# Patient Record
Sex: FEMALE | Race: WHITE | NOT HISPANIC OR LATINO | Employment: FULL TIME | URBAN - NONMETROPOLITAN AREA
[De-identification: names, ages, dates, MRNs, and addresses within clinical notes are randomized per-mention and may not be internally consistent; named-entity substitution may affect disease eponyms.]

---

## 2018-06-16 ENCOUNTER — HOSPITAL ENCOUNTER (EMERGENCY)
Facility: HOSPITAL | Age: 43
Discharge: HOME | End: 2018-06-17
Attending: EMERGENCY MEDICINE | Admitting: EMERGENCY MEDICINE

## 2018-06-16 ENCOUNTER — APPOINTMENT (OUTPATIENT)
Dept: GENERAL RADIOLOGY | Facility: HOSPITAL | Age: 43
End: 2018-06-16

## 2018-06-16 DIAGNOSIS — S90.32XA CONTUSION OF LEFT FOOT, INITIAL ENCOUNTER: Primary | ICD-10-CM

## 2018-06-16 PROCEDURE — 73630 X-RAY EXAM OF FOOT: CPT | Performed by: RADIOLOGY

## 2018-06-16 PROCEDURE — 99283 EMERGENCY DEPT VISIT LOW MDM: CPT

## 2018-06-16 PROCEDURE — 73630 X-RAY EXAM OF FOOT: CPT

## 2018-06-16 RX ORDER — TRAMADOL HYDROCHLORIDE 50 MG/1
50 TABLET ORAL EVERY 4 HOURS PRN
Qty: 12 TABLET | Refills: 0 | Status: SHIPPED | OUTPATIENT
Start: 2018-06-16 | End: 2019-01-18

## 2018-06-16 RX ORDER — HYDROCODONE BITARTRATE AND ACETAMINOPHEN 5; 325 MG/1; MG/1
1 TABLET ORAL EVERY 6 HOURS PRN
Status: DISCONTINUED | OUTPATIENT
Start: 2018-06-16 | End: 2018-06-17 | Stop reason: HOSPADM

## 2018-06-16 RX ADMIN — HYDROCODONE BITARTRATE AND ACETAMINOPHEN 1 TABLET: 5; 325 TABLET ORAL at 23:59

## 2018-06-17 VITALS
OXYGEN SATURATION: 99 % | TEMPERATURE: 98.2 F | SYSTOLIC BLOOD PRESSURE: 125 MMHG | HEART RATE: 80 BPM | WEIGHT: 137 LBS | BODY MASS INDEX: 26.9 KG/M2 | RESPIRATION RATE: 18 BRPM | HEIGHT: 60 IN | DIASTOLIC BLOOD PRESSURE: 74 MMHG

## 2018-06-17 NOTE — ED PROVIDER NOTES
Subjective     History provided by:  Patient  Lower Extremity Issue   Location:  Foot  Injury: yes    Mechanism of injury: crush    Crush:     Mechanism:  Falling object  Foot location:  L foot  Pain details:     Quality:  Aching and throbbing    Radiates to:  Does not radiate    Severity:  Moderate    Onset quality:  Gradual    Timing:  Constant  Dislocation: no    Foreign body present:  No foreign bodies  Tetanus status:  Up to date  Prior injury to area:  No  Relieved by:  None tried  Worsened by:  Nothing  Ineffective treatments:  None tried  Associated symptoms: swelling    Associated symptoms: no fever        Review of Systems   Constitutional: Negative.  Negative for fever.   HENT: Negative.    Respiratory: Negative.    Cardiovascular: Negative.  Negative for chest pain.   Gastrointestinal: Negative.  Negative for abdominal pain.   Endocrine: Negative.    Genitourinary: Negative.  Negative for dysuria.   Skin: Negative.    Neurological: Negative.    Psychiatric/Behavioral: Negative.    All other systems reviewed and are negative.      No past medical history on file.    Allergies   Allergen Reactions   • Penicillins Hives       No past surgical history on file.    No family history on file.    Social History     Social History   • Marital status: Single     Social History Main Topics   • Drug use: Unknown     Other Topics Concern   • Not on file           Objective   Physical Exam   Constitutional: She is oriented to person, place, and time. She appears well-developed and well-nourished. No distress.   HENT:   Head: Normocephalic and atraumatic.   Right Ear: External ear normal.   Left Ear: External ear normal.   Nose: Nose normal.   Eyes: Conjunctivae and EOM are normal. Pupils are equal, round, and reactive to light.   Neck: Normal range of motion. Neck supple. No JVD present. No tracheal deviation present.   Cardiovascular: Normal rate, regular rhythm and normal heart sounds.    No murmur  heard.  Pulmonary/Chest: Effort normal and breath sounds normal. No respiratory distress. She has no wheezes.   Abdominal: Soft. Bowel sounds are normal. There is no tenderness.   Musculoskeletal: Normal range of motion. She exhibits no edema or deformity.        Left foot: There is tenderness and swelling.   Neurological: She is alert and oriented to person, place, and time. No cranial nerve deficit.   Skin: Skin is warm and dry. No rash noted. She is not diaphoretic. No erythema. No pallor.   Psychiatric: She has a normal mood and affect. Her behavior is normal. Thought content normal.   Nursing note and vitals reviewed.      Procedures           ED Course                  MDM  Number of Diagnoses or Management Options  Contusion of left foot, initial encounter: new and does not require workup     Amount and/or Complexity of Data Reviewed  Tests in the radiology section of CPT®: reviewed    Risk of Complications, Morbidity, and/or Mortality  Presenting problems: low  Diagnostic procedures: low  Management options: low    Patient Progress  Patient progress: stable        Final diagnoses:   Contusion of left foot, initial encounter            Karen Mace, TRINA  06/17/18 8167

## 2018-06-18 ENCOUNTER — TELEPHONE (OUTPATIENT)
Dept: ORTHOPEDIC SURGERY | Facility: CLINIC | Age: 43
End: 2018-06-18

## 2018-06-19 ENCOUNTER — OFFICE VISIT (OUTPATIENT)
Dept: ORTHOPEDIC SURGERY | Facility: CLINIC | Age: 43
End: 2018-06-19

## 2018-06-19 VITALS — HEIGHT: 61 IN | TEMPERATURE: 98.1 F | BODY MASS INDEX: 26.51 KG/M2 | WEIGHT: 140.4 LBS

## 2018-06-19 DIAGNOSIS — S90.32XA CONTUSION OF LEFT FOOT, INITIAL ENCOUNTER: Primary | ICD-10-CM

## 2018-06-19 DIAGNOSIS — S90.812A ABRASION, FOOT, LEFT, INITIAL ENCOUNTER: ICD-10-CM

## 2018-06-19 PROCEDURE — 99202 OFFICE O/P NEW SF 15 MIN: CPT | Performed by: ORTHOPAEDIC SURGERY

## 2018-06-19 NOTE — PROGRESS NOTES
"Patient:  Ellie Muro is a 42 y.o. female    Chief Complaint/ Reason for Visit:    Chief Complaint   Patient presents with   • Left Foot - Establish Care, Pain       HPI:  This pleasant young lady tells me that this past Saturday she was taking the T topics off of her jeep, when one of them slipped out of her hands and fell, forcefully striking the top of her left foot.  She says the top section probably wait about 30 pounds.  She had the immediate onset of severe sharp pain and says \"it blew up immediately\", referring to immediate abrupt and significant swelling.  She went to the urgent care center where x-rays were negative for fracture, and was placed in a boot and issued crutches.  She called and requested to be seen here in the office and I have worked her in today.  Her pain is mild to moderate exacerbated by direct contact located on the dorsum of her left foot and alleviated by rest and elevation.  She has no calf pain, no chest pain, and no shortness of breath.  She has no history of a serious injury to the left foot before.      PMH:    Past Medical History:   Diagnosis Date   • Depression with anxiety        PSH:  History reviewed. No pertinent surgical history.    Social Hx:    Social History     Social History   • Marital status: Single     Spouse name: N/A   • Number of children: N/A   • Years of education: N/A     Occupational History   • Not on file.     Social History Main Topics   • Smoking status: Never Smoker   • Smokeless tobacco: Never Used   • Alcohol use Yes      Comment: 2 per week   • Drug use: No   • Sexual activity: Defer     Other Topics Concern   • Not on file     Social History Narrative   • No narrative on file       Family Hx:    Family History   Problem Relation Age of Onset   • Hypertension Mother    • Diabetes Father         type 2   • Cancer Maternal Uncle         Lung   • Heart disease Paternal Grandmother        Meds:    Current Outpatient Prescriptions:   •  traMADol " "(ULTRAM) 50 MG tablet, Take 1 tablet by mouth Every 4 (Four) Hours As Needed for Moderate Pain ., Disp: 12 tablet, Rfl: 0    Allergies:    Allergies   Allergen Reactions   • Penicillins Hives       ROS:  Review of Systems   All other systems reviewed and are negative.      Vitals:    06/19/18 0946   Temp: 98.1 °F (36.7 °C)   TempSrc: Temporal Artery    Weight: 63.7 kg (140 lb 6.4 oz)   Height: 153.7 cm (60.5\")     Body mass index is 26.97 kg/m².    Physical Exam    The patient is awake, alert, and oriented ×3.  The patient is in no acute distress.  Breathing is regular and unlabored with a respiratory rate of 12/m.  Extraocular movements and pupillary responses are symmetrically intact. Sclerae are anicteric.   Hearing is within normal limits.  Speech is within normal limits.  There is no jugular venous distention.    Left foot: The patient has a limp antalgic from left.  She has moderate swelling across the dorsal left forefoot.  There is a small area about 3 x 4 mm that is dark and on the dorsal foot.  I suspect this was the area of direct contact.  There is no surrounding erythema.  There is no cellulitis or lymphangitis.  The patient has a palpable regular dorsalis pedis pulse with satisfactory amplitude and a current heart rate of about 72 beats are minute.  Her left calf is soft and nontender.  Alignment, rotation, and cascade of the toes left foot appear within normal limits and appear symmetrical when compared to the right foot.  However, the patient does have ecchymosis going down into her toes and the plantar arch of her left foot.    Radiology: X-rays: 3 views the patient's left foot were reviewed on the [facility] radiology system from this past weekend.  Comparison images were not available.  These images show considerable soft tissue swelling, especially dorsally, however I see no fracture.  There is no dislocation or other osseous or articular abnormality acutely on these images.  I reviewed the " radiologist's report, and I agree with their findings.        Assessment:     Diagnosis Plan   1. Contusion of left foot, initial encounter     2. Abrasion, foot, left, initial encounter             Plan:  The patient has a boot orthosis that she received in the emergency facility.  I encouraged her to continue to use it as she was able, but advised her that after a week or so she could probably transition out of it to comfortable, supportive well-made athletic-type shoes.  Activity recommendations and precautions were discussed and the patient voiced understanding.  I'll see her back in a couple weeks for recheck.

## 2018-06-25 ENCOUNTER — TRANSCRIBE ORDERS (OUTPATIENT)
Dept: ADMINISTRATIVE | Facility: HOSPITAL | Age: 43
End: 2018-06-25

## 2018-06-25 ENCOUNTER — TELEPHONE (OUTPATIENT)
Dept: ORTHOPEDIC SURGERY | Facility: CLINIC | Age: 43
End: 2018-06-25

## 2018-06-25 DIAGNOSIS — S90.32XA CONTUSION OF LEFT FOOT, INITIAL ENCOUNTER: Primary | ICD-10-CM

## 2018-06-25 NOTE — TELEPHONE ENCOUNTER
Pat was last seen on 6/19. Patient is in extreme pain. Stated it is getting worse. This morning she can not bear weight on her lt foot. She stated that she walks a lot at work and cannot wear the boot with the sore on the top of the foot. 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 She is asking if an MRI would show anything and if she can get one. Patient's next appt is on 7/3. Please advise. Thank you!

## 2018-06-25 NOTE — TELEPHONE ENCOUNTER
As per LAMAR, MRI is ordered.   Additionally, a work note will be sent to her stating that she is to be off her foot. This means that either she will have to sit during work or be off work completely. I relayed this information to the patient.    LAMAR then added that she is to be off foot altogether.  After she schedules MRI, she is to call office back and set up appointment.  I LMOM for patient to return call to office to get this last bit of information.

## 2018-06-28 ENCOUNTER — TELEPHONE (OUTPATIENT)
Dept: ORTHOPEDIC SURGERY | Facility: CLINIC | Age: 43
End: 2018-06-28

## 2018-06-28 ENCOUNTER — HOSPITAL ENCOUNTER (OUTPATIENT)
Dept: MRI IMAGING | Facility: HOSPITAL | Age: 43
Discharge: HOME | End: 2018-06-28
Attending: ORTHOPAEDIC SURGERY | Admitting: ORTHOPAEDIC SURGERY

## 2018-06-28 DIAGNOSIS — S90.32XA CONTUSION OF LEFT FOOT, INITIAL ENCOUNTER: ICD-10-CM

## 2018-06-28 PROCEDURE — 73718 MRI LOWER EXTREMITY W/O DYE: CPT

## 2018-06-28 NOTE — TELEPHONE ENCOUNTER
Dr. Young, I will call the patient, however I used this phone #  [phone] to call patient today to ask her about what Rx the urgent care put her on. It worked fine then. Thanks, Qagan Tayagungin

## 2018-06-28 NOTE — TELEPHONE ENCOUNTER
I called patient and informed of reply from LAMAR and MRI results. She will continue to stay off of her foot and f/u on 7/3. She was advised to call if she has any worsening problems or concerns./[place]

## 2018-06-28 NOTE — TELEPHONE ENCOUNTER
That's fine, maybe my phone is restricted from calling long-distance numbers or something, as I try to 3 times and it didn't work.  Thank you for calling her.

## 2018-06-28 NOTE — TELEPHONE ENCOUNTER
"Patient called for results of MRI done today. Results are already in the chart. She is concerned. She went to a [facility] on Monday, 6/25 and says they treated her for infection of the foot. She has \"pus pockets\". Was put on Bactrim DS bid and Voltaren 75mg bid. Please advise.  "

## 2018-06-28 NOTE — TELEPHONE ENCOUNTER
The one and only phone number that she gave us does not work.  Something is wrong.    Please either call her or have the MAs call her and have her keep her scheduled follow-up with me next week.  She does not have any fractures nor is there any MRI evidence of infection.  She probably should not have insisted on going back to work.

## 2018-07-03 ENCOUNTER — TELEPHONE (OUTPATIENT)
Dept: ORTHOPEDIC SURGERY | Facility: CLINIC | Age: 43
End: 2018-07-03

## 2018-07-03 ENCOUNTER — OFFICE VISIT (OUTPATIENT)
Dept: ORTHOPEDIC SURGERY | Facility: CLINIC | Age: 43
End: 2018-07-03

## 2018-07-03 VITALS — TEMPERATURE: 97.6 F | WEIGHT: 139.8 LBS | BODY MASS INDEX: 26.39 KG/M2 | HEIGHT: 61 IN

## 2018-07-03 DIAGNOSIS — S90.32XD CONTUSION OF LEFT FOOT, SUBSEQUENT ENCOUNTER: ICD-10-CM

## 2018-07-03 DIAGNOSIS — S90.32XD TRAUMATIC HEMATOMA OF LEFT FOOT, SUBSEQUENT ENCOUNTER: Primary | ICD-10-CM

## 2018-07-03 PROBLEM — S90.32XA TRAUMATIC HEMATOMA OF LEFT FOOT: Status: ACTIVE | Noted: 2018-07-03

## 2018-07-03 PROCEDURE — 99213 OFFICE O/P EST LOW 20 MIN: CPT | Performed by: ORTHOPAEDIC SURGERY

## 2018-07-03 RX ORDER — SULFAMETHOXAZOLE AND TRIMETHOPRIM 800; 160 MG/1; MG/1
1 TABLET ORAL
COMMUNITY
Start: 2018-06-25 | End: 2018-07-05

## 2018-07-03 RX ORDER — DICLOFENAC SODIUM 75 MG/1
75 TABLET, DELAYED RELEASE ORAL
COMMUNITY
Start: 2018-06-25 | End: 2019-01-18

## 2018-07-03 NOTE — PROGRESS NOTES
Patient:  Ellie Muro is a 42 y.o. female    Chief Complaint/ Reason for Visit:    Chief Complaint   Patient presents with   • Left Foot - Follow-up, Pain       HPI:  The patient returns today, accompanied by her mother, for recheck on her left foot injury and reviewed the MRI that we ordered.  Her pain got worse.  We ordered an MRI to rule out occult fracture.  Also, a few days after her last visit, she developed redness on her foot and went to an immediate care center.  She said they told her she might possibly have an infection, and put her on an antibiotic.  She is on Bactrim, and says she has a couple of more days of treatment.  She has had no fever, chills, sweats, or shakes.  She feels like the foot has improved considerably with respect to swelling, pain, and discoloration.  Rest and elevation help her discomfort.  She is able to tolerate the boot a little more now than she was before.  She has had no calf pain, chest pain, or shortness of breath.  She has had no fever, chills, sweats, or shakes.      PMH:    Past Medical History:   Diagnosis Date   • Depression with anxiety        PSH:  History reviewed. No pertinent surgical history.    Social Hx:    Social History     Social History   • Marital status: Single     Spouse name: N/A   • Number of children: N/A   • Years of education: N/A     Occupational History   • Not on file.     Social History Main Topics   • Smoking status: Never Smoker   • Smokeless tobacco: Never Used   • Alcohol use Yes      Comment: 2 per week   • Drug use: No   • Sexual activity: Defer     Other Topics Concern   • Not on file     Social History Narrative   • No narrative on file       Family Hx:    Family History   Problem Relation Age of Onset   • Hypertension Mother    • Diabetes Father         type 2   • Cancer Maternal Uncle         Lung   • Heart disease Paternal Grandmother        Meds:    Current Outpatient Prescriptions:   •  diclofenac (VOLTAREN) 75 MG EC tablet, Take 75  "mg by mouth., Disp: , Rfl:   •  sulfamethoxazole-trimethoprim (BACTRIM DS,SEPTRA DS) 800-160 MG per tablet, Take 1 tablet by mouth., Disp: , Rfl:   •  traMADol (ULTRAM) 50 MG tablet, Take 1 tablet by mouth Every 4 (Four) Hours As Needed for Moderate Pain ., Disp: 12 tablet, Rfl: 0    Allergies:    Allergies   Allergen Reactions   • Penicillins Hives       ROS:  Review of Systems    Vitals:    07/03/18 0940   Temp: 97.6 °F (36.4 °C)   TempSrc: Temporal Artery    Weight: 63.4 kg (139 lb 12.8 oz)   Height: 153.7 cm (60.5\")     Body mass index is 26.85 kg/m².    Physical Exam    The patient is awake, alert, and oriented ×3.  The patient is in no acute distress.  Breathing is regular and unlabored with a respiratory rate of 12/m.  Extraocular movements and pupillary responses are symmetrically intact. Sclerae are anicteric.   Hearing is within normal limits.  Speech is within normal limits.  There is no jugular venous distention.    Left foot: The patient still has swelling and fullness in the dorsal forefoot consistent with subcutaneous hematoma.  There is a small punctate area perhaps 3 x 4 mm that has an eschar, but there is no surrounding erythema, no cellulitis, and no lymphangitis.  Her left calf is soft and nontender with no venous cord.  Sensory exams decreased in the dorsal forefoot and toes distal to the injury.  There is some deep tendon ecchymosis developing in her toes.  She has active dorsiflexion and plantarflexion of her toes actively.  Capillary filling is brisk in all toes.        Radiology: MRI left foot: I reviewed the images and the radiologist's report.  The patient appears to have a subcutaneous hematoma.  There is no fracture.  There is no obvious tendon injury.  I reviewed the radiologist's report, and my findings are essentially the same.  There is no comparison MRI available.        Assessment:     Diagnosis Plan   1. Traumatic hematoma of left foot, subsequent encounter     2. Contusion of " left foot, subsequent encounter             Plan:  I advised the patient that she should continue to elevate her foot.  I also fitted her with an Ace bandage for compression to help accelerate the resolution of her hematoma.  Infection precautions were reviewed and reinforced, and I advised her to finish all of her antibiotic course.  I think it would be best if she continue to work from home, unless she could return to work seated work only.    I spent 17 minutes in the evaluation and management of this patient today, 12 minutes of which were spent in face-to-face contact, discussion, education, counseling, and question and answer session.  Additional time was spent reviewing records from her visit to the immediate care center, etc.

## 2018-07-03 NOTE — TELEPHONE ENCOUNTER
Patient received a letter from [employer] today stating to work from home was not approved by her job. She stated that she needs another letter letting her go back to work with certain restrictions. Patient req a call to discuss what letter needs to state please advise. Thank you!

## 2018-07-03 NOTE — TELEPHONE ENCOUNTER
Patient called back stating she needs an answer today regarding work note. I informed her that LAMAR has already gone to surgery and the office is closed tomorrow for holiday. Cannot confirm that LAMAR will see this message today.  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 They will not let her work from home. She can't afford to take off-it would be unpaid leave.

## 2018-07-05 NOTE — TELEPHONE ENCOUNTER
Letter has been created and given to LAMAR for signature and the patient has been notified and at patients request it has been faxed to the number she provided.

## 2018-07-05 NOTE — TELEPHONE ENCOUNTER
Please give her a work note that says she may do see did/sedentary work only.  She may only be ambulatory as necessary to get to and from her place of work, and 2 go on breaks, to eat lunch, and to go to the bathroom.

## 2018-11-02 ENCOUNTER — APPOINTMENT (OUTPATIENT)
Dept: WOMENS IMAGING | Facility: HOSPITAL | Age: 43
End: 2018-11-02

## 2018-11-02 PROCEDURE — 77067 SCR MAMMO BI INCL CAD: CPT | Performed by: RADIOLOGY

## 2018-11-02 PROCEDURE — 77063 BREAST TOMOSYNTHESIS BI: CPT | Performed by: RADIOLOGY

## 2019-01-18 ENCOUNTER — OFFICE VISIT (OUTPATIENT)
Dept: FAMILY MEDICINE CLINIC | Facility: CLINIC | Age: 44
End: 2019-01-18

## 2019-01-18 VITALS
SYSTOLIC BLOOD PRESSURE: 128 MMHG | WEIGHT: 146 LBS | OXYGEN SATURATION: 98 % | HEIGHT: 61 IN | DIASTOLIC BLOOD PRESSURE: 88 MMHG | BODY MASS INDEX: 27.56 KG/M2 | RESPIRATION RATE: 14 BRPM | HEART RATE: 89 BPM

## 2019-01-18 DIAGNOSIS — Z13.220 SCREENING FOR HYPERLIPIDEMIA: ICD-10-CM

## 2019-01-18 DIAGNOSIS — F41.9 ANXIETY AND DEPRESSION: Primary | ICD-10-CM

## 2019-01-18 DIAGNOSIS — F32.A ANXIETY AND DEPRESSION: Primary | ICD-10-CM

## 2019-01-18 DIAGNOSIS — M25.50 ARTHRALGIA, UNSPECIFIED JOINT: ICD-10-CM

## 2019-01-18 DIAGNOSIS — M79.10 MYALGIA: ICD-10-CM

## 2019-01-18 DIAGNOSIS — R31.9 HEMATURIA, UNSPECIFIED TYPE: ICD-10-CM

## 2019-01-18 DIAGNOSIS — R35.0 URINARY FREQUENCY: ICD-10-CM

## 2019-01-18 DIAGNOSIS — R53.82 CHRONIC FATIGUE: ICD-10-CM

## 2019-01-18 PROBLEM — S90.32XA TRAUMATIC HEMATOMA OF LEFT FOOT: Status: RESOLVED | Noted: 2018-07-03 | Resolved: 2019-01-18

## 2019-01-18 PROBLEM — S90.32XA CONTUSION OF LEFT FOOT: Status: RESOLVED | Noted: 2018-06-19 | Resolved: 2019-01-18

## 2019-01-18 PROBLEM — S90.812A: Status: RESOLVED | Noted: 2018-06-19 | Resolved: 2019-01-18

## 2019-01-18 LAB
25(OH)D3+25(OH)D2 SERPL-MCNC: 24.6 NG/ML (ref 30–100)
ALBUMIN SERPL-MCNC: 4.5 G/DL (ref 3.5–5.2)
ALBUMIN/GLOB SERPL: 1.4 G/DL
ALP SERPL-CCNC: 37 U/L (ref 39–117)
ALT SERPL-CCNC: 21 U/L (ref 1–33)
APPEARANCE UR: CLEAR
AST SERPL-CCNC: 18 U/L (ref 1–32)
BACTERIA #/AREA URNS HPF: ABNORMAL /HPF
BILIRUB BLD-MCNC: NEGATIVE MG/DL
BILIRUB SERPL-MCNC: 0.3 MG/DL (ref 0.1–1.2)
BILIRUB UR QL STRIP: NEGATIVE
BUN SERPL-MCNC: 13 MG/DL (ref 6–20)
BUN/CREAT SERPL: 16.9 (ref 7–25)
CALCIUM SERPL-MCNC: 9.9 MG/DL (ref 8.6–10.5)
CASTS URNS MICRO: ABNORMAL
CHLORIDE SERPL-SCNC: 98 MMOL/L (ref 98–107)
CHOLEST SERPL-MCNC: 195 MG/DL (ref 0–200)
CLARITY, POC: ABNORMAL
CO2 SERPL-SCNC: 23.6 MMOL/L (ref 22–29)
COLOR UR: ABNORMAL
COLOR UR: YELLOW
CREAT SERPL-MCNC: 0.77 MG/DL (ref 0.57–1)
CRP SERPL-MCNC: 0.07 MG/DL (ref 0–0.5)
EPI CELLS #/AREA URNS HPF: ABNORMAL /HPF
ERYTHROCYTE [DISTWIDTH] IN BLOOD BY AUTOMATED COUNT: 13.3 % (ref 11.7–13)
GLOBULIN SER CALC-MCNC: 3.2 GM/DL
GLUCOSE SERPL-MCNC: 67 MG/DL (ref 65–99)
GLUCOSE UR QL: NEGATIVE
GLUCOSE UR STRIP-MCNC: NEGATIVE MG/DL
HCT VFR BLD AUTO: 42.3 % (ref 35.6–45.5)
HDLC SERPL-MCNC: 87 MG/DL (ref 40–60)
HGB BLD-MCNC: 13.6 G/DL (ref 11.9–15.5)
HGB UR QL STRIP: NEGATIVE
KETONES UR QL STRIP: (no result)
KETONES UR QL: ABNORMAL
LDLC SERPL CALC-MCNC: 96 MG/DL (ref 0–100)
LEUKOCYTE EST, POC: NEGATIVE
LEUKOCYTE ESTERASE UR QL STRIP: NEGATIVE
MCH RBC QN AUTO: 29.8 PG (ref 26.9–32)
MCHC RBC AUTO-ENTMCNC: 32.2 G/DL (ref 32.4–36.3)
MCV RBC AUTO: 92.6 FL (ref 80.5–98.2)
NITRITE UR QL STRIP: NEGATIVE
NITRITE UR-MCNC: NEGATIVE MG/ML
PH UR STRIP: 5.5 [PH] (ref 5–8)
PH UR: 5 [PH] (ref 5–8)
PLATELET # BLD AUTO: 382 10*3/MM3 (ref 140–500)
POTASSIUM SERPL-SCNC: 4.9 MMOL/L (ref 3.5–5.2)
PROT SERPL-MCNC: 7.7 G/DL (ref 6–8.5)
PROT UR QL STRIP: NEGATIVE
PROT UR STRIP-MCNC: NEGATIVE MG/DL
RBC # BLD AUTO: 4.57 10*6/MM3 (ref 3.9–5.2)
RBC # UR STRIP: ABNORMAL /UL
RBC #/AREA URNS HPF: ABNORMAL /HPF
SODIUM SERPL-SCNC: 141 MMOL/L (ref 136–145)
SP GR UR: 1.02 (ref 1–1.03)
SP GR UR: 1.03 (ref 1–1.03)
TRIGL SERPL-MCNC: 62 MG/DL (ref 0–150)
TSH SERPL DL<=0.005 MIU/L-ACNC: 2.8 MIU/ML (ref 0.27–4.2)
UROBILINOGEN UR QL: NORMAL
UROBILINOGEN UR STRIP-MCNC: (no result) MG/DL
VLDLC SERPL CALC-MCNC: 12.4 MG/DL (ref 5–40)
WBC # BLD AUTO: 6.39 10*3/MM3 (ref 4.5–10.7)
WBC #/AREA URNS HPF: ABNORMAL /HPF

## 2019-01-18 PROCEDURE — 99204 OFFICE O/P NEW MOD 45 MIN: CPT | Performed by: FAMILY MEDICINE

## 2019-01-18 PROCEDURE — 81002 URINALYSIS NONAUTO W/O SCOPE: CPT | Performed by: FAMILY MEDICINE

## 2019-01-18 RX ORDER — PROPRANOLOL HYDROCHLORIDE 20 MG/1
20 TABLET ORAL 3 TIMES DAILY
Qty: 90 TABLET | Refills: 1 | Status: SHIPPED | OUTPATIENT
Start: 2019-01-18 | End: 2019-02-18

## 2019-01-18 RX ORDER — BUPROPION HYDROCHLORIDE 300 MG/1
300 TABLET ORAL
COMMUNITY
Start: 2015-07-23 | End: 2019-01-18

## 2019-01-18 RX ORDER — BUPROPION HYDROCHLORIDE 150 MG/1
150 TABLET ORAL DAILY
Qty: 30 TABLET | Refills: 3 | Status: SHIPPED | OUTPATIENT
Start: 2019-01-18 | End: 2019-03-28 | Stop reason: SDUPTHER

## 2019-01-18 RX ORDER — SULFAMETHOXAZOLE AND TRIMETHOPRIM 800; 160 MG/1; MG/1
1 TABLET ORAL 2 TIMES DAILY
Qty: 14 TABLET | Refills: 0 | Status: SHIPPED | OUTPATIENT
Start: 2019-01-18 | End: 2019-02-18

## 2019-01-18 NOTE — PROGRESS NOTES
Subjective   Ellie Muro is a 43 y.o. female.     Chief Complaint   Patient presents with   • Anxiety   • Depression   • Insomnia       HPI     Moved to [place] from [place] about 2 yr ago and needs to establish care with a PCP after a 2-3 yr gap in insurance coverage     Anxiety and Depression:  -chronic condition  -mood generally worsens during the winter  -she has gained 20 lb in the last 2 years, which has affected her self esteem  -anhedonia and apathy interspersed with periods of intense worrying  -panic attacks 2-3 x per week  -co-morbid claustrophobia  -comorbid insomnia, for which OTC Unisom did help a little when she tried it a few months ago  -previously treated with wellbutrin  mg/day and ativan prn; this regimen worked well for her and she would like to resume it if possible  -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  Her chiropractor has suggested a lab work up for inflammatory conditions.           Review of Systems   Constitutional: Positive for unexpected weight change. Negative for fatigue and fever.   HENT: Negative for congestion and sore throat.    Respiratory: Negative for cough and shortness of breath.    Cardiovascular: Negative for chest pain and palpitations.   Gastrointestinal: Negative for abdominal pain and nausea.   Genitourinary: Positive for frequency and urgency. Negative for difficulty urinating, dysuria and hematuria.        Nocturia 3 x per night   Musculoskeletal: Positive for arthralgias, back pain and myalgias. Negative for gait problem.   Skin: Negative for rash and wound.   Neurological: Negative for dizziness and headaches.   Psychiatric/Behavioral: Positive for  decreased concentration, dysphoric mood and sleep disturbance. Negative for self-injury and suicidal ideas. The patient is nervous/anxious.        The following portions of the patient's history were reviewed and updated as appropriate: allergies, current medications, past family history, past medical history, past social history, past surgical history and problem list.    Past Medical History:   Diagnosis Date   • Allergic rhinitis    • Anxiety    • Depression    • Overactive bladder     due to a congenitally small bladder     Past Surgical History:   Procedure Laterality Date   • WISDOM TOOTH EXTRACTION       Family History   Problem Relation Age of Onset   • Hypertension Mother    • Thyroid disease Mother    • Depression Mother    • Alcohol abuse Mother    • Diabetes Father         type 2   • Cancer Maternal Uncle         Lung   • Heart disease Paternal Grandmother      Social History     Tobacco Use   • Smoking status: Never Smoker   • Smokeless tobacco: Never Used   Substance and Sexual Activity   • Alcohol use: Yes     Comment: about 2 drinks per night   • Drug use: No                Social History Narrative    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  Lives at home with her 2 dogs.          Allergies   Allergen Reactions   • Penicillins Angioedema     And hives        Medications:  none    Objective     Vitals:    01/18/19 1045   BP: 128/88   Pulse: 89   Resp: 14   SpO2: 98%       Physical Exam   Constitutional: She appears well-developed and well-nourished. No distress.   HENT:   Head: Normocephalic and atraumatic.   Mouth/Throat: Oropharynx is clear and moist.   Eyes: Conjunctivae are normal. Pupils are equal, round, and reactive to light.   Neck: No thyromegaly present.   Cardiovascular: Normal rate, regular rhythm and normal heart sounds. Exam reveals no gallop and no friction rub.   No murmur heard.  Pulses:       Radial pulses are 2+ on the right side, and 2+ on the left side.   Pulmonary/Chest: Effort  normal and breath sounds normal. No respiratory distress. She has no wheezes. She has no rhonchi. She has no rales.   Abdominal: Soft. Bowel sounds are normal. She exhibits no distension. There is no tenderness.   Lymphadenopathy:     She has no cervical adenopathy.   Neurological: She has normal strength. Gait normal.   Skin: Skin is warm and dry.   Psychiatric: Her mood appears anxious. Her speech is rapid and/or pressured.       ASSESSMENT/PLAN             Visit Diagnoses     Anxiety and depression    -  Primary    Relevant Medications    Resume buPROPion XL (WELLBUTRIN XL) 150 MG 24 hr tablet    propranolol (INDERAL) 20 MG tablet TID prn anxiety or panic attack  Pt cautioned about risk for lowered seizure threshold with Wellbutrin and risk for dizziness with propranolol    Other Relevant Orders    Ambulatory Referral to Psychiatry    TSH    Chronic fatigue        Relevant Orders    Vitamin D 25 hydroxy    Comprehensive metabolic panel    CBC No Differential    TSH    Myalgia        Relevant Orders    Sedimentation rate, automated    C-reactive protein    SEPIDEH    Rheumatoid Factor    Arthralgia, unspecified joint        Relevant Orders    Sedimentation rate, automated    C-reactive protein    SEPIDEH    Rheumatoid Factor    Urinary frequency        Relevant Orders    POC Urinalysis Dipstick (Completed) + for Hematuria, and ketones (pt on ketogenic diet)    Urine Culture - Urine, Urine, Clean Catch    Urinalysis With Microscopic - Urine, Clean Catch  Will treat for UTI with a 7 day course of bactrim BID      Screening for hyperlipidemia        Relevant Orders    Lipid panel         Patient Instructions   Try over the counter Unisom and Melatonin as needed for insomnia.      Try to avoid electronic screens for at least 1 hour prior to bed, limit fluids after dinner, and set a consistent bed time.        Return in about 1 month (around 2/18/2019) for Recheck mood.      Kenna Vaughn MD  01/18/19

## 2019-01-18 NOTE — PATIENT INSTRUCTIONS
Try over the counter Unisom and Melatonin as needed for insomnia.      Try to avoid electronic screens for at least 1 hour prior to bed, limit fluids after dinner, and set a consistent bed time.

## 2019-01-19 LAB
ANA SER QL: NEGATIVE
ERYTHROCYTE [SEDIMENTATION RATE] IN BLOOD BY WESTERGREN METHOD: 5 MM/HR (ref 0–20)
RHEUMATOID FACT SERPL-ACNC: <10 IU/ML (ref 0–13.9)

## 2019-01-20 ENCOUNTER — PATIENT MESSAGE (OUTPATIENT)
Dept: FAMILY MEDICINE CLINIC | Facility: CLINIC | Age: 44
End: 2019-01-20

## 2019-01-20 LAB
BACTERIA UR CULT: NO GROWTH
BACTERIA UR CULT: NORMAL

## 2019-02-14 NOTE — PROGRESS NOTES
F/u mood    Anxiety/depression: Resumed bupropion 150mg. Prn Propranolol for panic attacks    Anxiety and depression    -  Primary      Relevant Medications     Resume buPROPion XL (WELLBUTRIN XL) 150 MG 24 hr tablet     propranolol (INDERAL) 20 MG tablet TID prn anxiety or panic attack  Pt cautioned about risk for lowered seizure threshold with Wellbutrin and risk for dizziness with propranolol     Other Relevant Orders     Ambulatory Referral to Psychiatry     TSH     Chronic fatigue         Relevant Orders     Vitamin D 25 hydroxy     Comprehensive metabolic panel     CBC No Differential     TSH     Myalgia         Relevant Orders     Sedimentation rate, automated     C-reactive protein     SEPIDEH     Rheumatoid Factor     Arthralgia, unspecified joint         Relevant Orders     Sedimentation rate, automated     C-reactive protein     SEPIDEH     Rheumatoid Factor     Urinary frequency         Relevant Orders     POC Urinalysis Dipstick (Completed) + for Hematuria, and ketones (pt on ketogenic diet)     Urine Culture - Urine, Urine, Clean Catch     Urinalysis With Microscopic - Urine, Clean Catch  Will treat for UTI with a 7 day course of bactrim BID        Screening for hyperlipidemia         Relevant Orders     Lipid panel

## 2019-02-18 ENCOUNTER — OFFICE VISIT (OUTPATIENT)
Dept: FAMILY MEDICINE CLINIC | Facility: CLINIC | Age: 44
End: 2019-02-18

## 2019-02-18 VITALS
BODY MASS INDEX: 27.56 KG/M2 | OXYGEN SATURATION: 98 % | HEIGHT: 61 IN | WEIGHT: 146 LBS | SYSTOLIC BLOOD PRESSURE: 122 MMHG | DIASTOLIC BLOOD PRESSURE: 80 MMHG | HEART RATE: 91 BPM

## 2019-02-18 DIAGNOSIS — R31.29 MICROSCOPIC HEMATURIA: Primary | ICD-10-CM

## 2019-02-18 DIAGNOSIS — F32.A ANXIETY AND DEPRESSION: ICD-10-CM

## 2019-02-18 DIAGNOSIS — F41.9 ANXIETY AND DEPRESSION: ICD-10-CM

## 2019-02-18 DIAGNOSIS — Z79.899 HIGH RISK MEDICATION USE: ICD-10-CM

## 2019-02-18 DIAGNOSIS — Z87.448 HISTORY OF HEMATURIA: ICD-10-CM

## 2019-02-18 LAB
BILIRUB BLD-MCNC: NEGATIVE MG/DL
CLARITY, POC: ABNORMAL
COLOR UR: ABNORMAL
GLUCOSE UR STRIP-MCNC: NEGATIVE MG/DL
KETONES UR QL: ABNORMAL
LEUKOCYTE EST, POC: NEGATIVE
NITRITE UR-MCNC: NEGATIVE MG/ML
PH UR: 5 [PH] (ref 5–8)
POC AMPHETAMINES: NEGATIVE
POC BARBITURATES: NEGATIVE
POC BENZODIAZEPHINES: NEGATIVE
POC COCAINE: NEGATIVE
POC METHADONE: NEGATIVE
POC METHAMPHETAMINE SCREEN URINE: NEGATIVE
POC OPIATES: NEGATIVE
POC OXYCODONE: NEGATIVE
POC PHENCYCLIDINE: NEGATIVE
POC PROPOXYPHENE: NEGATIVE
POC THC: NEGATIVE
POC TRICYCLIC ANTIDEPRESSANTS: NEGATIVE
PROT UR STRIP-MCNC: NEGATIVE MG/DL
RBC # UR STRIP: ABNORMAL /UL
SP GR UR: 1.02 (ref 1–1.03)
UROBILINOGEN UR QL: NORMAL

## 2019-02-18 PROCEDURE — 99214 OFFICE O/P EST MOD 30 MIN: CPT | Performed by: FAMILY MEDICINE

## 2019-02-18 PROCEDURE — 81002 URINALYSIS NONAUTO W/O SCOPE: CPT | Performed by: FAMILY MEDICINE

## 2019-02-18 RX ORDER — LORAZEPAM 0.5 MG/1
0.5 TABLET ORAL EVERY 8 HOURS PRN
Qty: 45 TABLET | Refills: 0 | Status: SHIPPED | OUTPATIENT
Start: 2019-02-18 | End: 2019-05-02 | Stop reason: SDUPTHER

## 2019-02-18 NOTE — PROGRESS NOTES
Ellie Muro is a 43 y.o. female.   Chief Complaint   Patient presents with   • Anxiety   • bladder issues       Subjective       HPI       Anxiety and depression:  -pt resumed bupropion  mg daily about 1 month ago and is tolerating it well  -she thinks the bupropion is helping with depression but not anxiety  -she feels more motivated over-all, has started working out again, and her general energy level has improved  -last month she was also prescribed propranolol 20 mg TID prn panic attacks (she had previously taken ativan as a prn medication); she has not found the propranolol helpful   -panic attacks several times per week, usually triggered by work stress  -she would like to resume ativan as a prn medication if possible  -planning to schedule a psychiatry appt soon  -she has cut back on ETOH intake  -she is trying to lose weight by exercising regularly ([employer]) and following a low-carb diet; she feels that not being able to lose weight is having a negative affect on her self esteem  -she would like to start dating, but struggles with initiating the process due to anxiety and panic attacks    Bladder issues:  -chronic nocturia 1-3 x per night  -intermittent difficulty with initiating urination (few second delay)  -NO dysuria, incontinence or gross hematuria  -U/A at last office visit + for ketones and blood, but urine culture negative  -pt would like to get a follow up U/A today    Review of Systems   Constitutional: Negative for fatigue and fever.   HENT: Negative for congestion and sore throat.    Respiratory: Negative for cough and shortness of breath.    Cardiovascular: Negative for chest pain and palpitations.   Gastrointestinal: Negative for abdominal pain and nausea.   Genitourinary: Positive for difficulty urinating (occasionally) and frequency. Negative for dysuria, flank pain, hematuria, pelvic pain and urgency.   Musculoskeletal: Negative for arthralgias and myalgias.   Skin: Negative for rash.    Neurological: Negative for dizziness, seizures, syncope, weakness and headaches.   Psychiatric/Behavioral: Negative for dysphoric mood and suicidal ideas. The patient is nervous/anxious.        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  Lives at home with her 2 dogs.         Outpatient Medications Prior to Visit   Medication Sig Dispense Refill   • buPROPion XL (WELLBUTRIN XL) 150 MG 24 hr tablet Take 1 tablet by mouth Daily. 30 tablet 3   • propranolol (INDERAL) 20 MG tablet Take 1 tablet by mouth 3 (Three) Times a Day. 90 tablet 1     Objective     Vitals:    02/18/19 0940   BP: 122/80   Pulse: 91   SpO2: 98%     Physical Exam   Constitutional: She appears well-developed and well-nourished. No distress.   HENT:   Head: Normocephalic and atraumatic.   Mouth/Throat: Oropharynx is clear and moist.   Eyes: Conjunctivae are normal. Pupils are equal, round, and reactive to light.   Neck: No thyromegaly present.   Cardiovascular: Normal rate, regular rhythm and normal heart sounds. Exam reveals no gallop and no friction rub.   No murmur heard.  Pulses:       Radial pulses are 2+ on the right side, and 2+ on the left side.   Pulmonary/Chest: Effort normal and breath sounds normal. No respiratory distress. She has no wheezes. She has no rhonchi. She has no rales.   Abdominal: Soft. Bowel sounds are normal. She exhibits no distension. There is no tenderness.   Lymphadenopathy:     She has no cervical adenopathy.   Neurological: She has normal strength. Gait normal.   Skin: Skin is warm and dry.    Psychiatric: Her behavior is normal. Her mood appears anxious. Her speech is not rapid and/or pressured.       ASSESSMENT/PLAN           Visit Diagnoses     Microscopic hematuria    -  Primary    Relevant Orders    Ambulatory Referral to (Uro)Gynecology    History of hematuria        Relevant Orders    POC Urinalysis Dipstick (Completed) + for blood    Anxiety and depression      Continue burpropion  D/C propranolol    Relevant Medications    Resume LORazepam (ATIVAN) 0.5 MG tablet TID prn panic attacks  FRITZ reviewed and consistent with reported history.    Urine drug screen appropriate.    Controlled substance contract completed and signed.       High risk medication use        Relevant Orders    POC Urine Drug Screen, Triage (Completed) normal        Return in about 2 months (around 4/18/2019).      Kenna Vaughn MD  02/18/19

## 2019-03-28 DIAGNOSIS — F32.A ANXIETY AND DEPRESSION: ICD-10-CM

## 2019-03-28 DIAGNOSIS — F41.9 ANXIETY AND DEPRESSION: ICD-10-CM

## 2019-03-28 RX ORDER — BUPROPION HYDROCHLORIDE 150 MG/1
150 TABLET ORAL DAILY
Qty: 90 TABLET | Refills: 2 | Status: SHIPPED | OUTPATIENT
Start: 2019-03-28 | End: 2020-01-03 | Stop reason: SDUPTHER

## 2019-05-02 DIAGNOSIS — F32.A ANXIETY AND DEPRESSION: ICD-10-CM

## 2019-05-02 DIAGNOSIS — F41.9 ANXIETY AND DEPRESSION: ICD-10-CM

## 2019-05-02 RX ORDER — LORAZEPAM 0.5 MG/1
TABLET ORAL
Qty: 45 TABLET | Refills: 0 | Status: SHIPPED | OUTPATIENT
Start: 2019-05-02 | End: 2019-07-31 | Stop reason: SDUPTHER

## 2019-07-31 DIAGNOSIS — F32.A ANXIETY AND DEPRESSION: ICD-10-CM

## 2019-07-31 DIAGNOSIS — F41.9 ANXIETY AND DEPRESSION: ICD-10-CM

## 2019-07-31 RX ORDER — LORAZEPAM 0.5 MG/1
TABLET ORAL
Qty: 45 TABLET | Refills: 0 | Status: CANCELLED | OUTPATIENT
Start: 2019-07-31

## 2019-07-31 RX ORDER — LORAZEPAM 0.5 MG/1
0.5 TABLET ORAL EVERY 8 HOURS PRN
Qty: 45 TABLET | Refills: 0 | Status: SHIPPED | OUTPATIENT
Start: 2019-07-31 | End: 2019-08-02 | Stop reason: SDUPTHER

## 2019-07-31 NOTE — TELEPHONE ENCOUNTER
Spoke to patient she is aware that this the final refill of medication from this office. Patient was transferred to  to get number to someone that can help her find a new doctor.  Patient stated understanding

## 2019-08-02 DIAGNOSIS — F32.A ANXIETY AND DEPRESSION: ICD-10-CM

## 2019-08-02 DIAGNOSIS — F41.9 ANXIETY AND DEPRESSION: ICD-10-CM

## 2019-08-02 RX ORDER — LORAZEPAM 0.5 MG/1
0.5 TABLET ORAL EVERY 8 HOURS PRN
Qty: 45 TABLET | Refills: 0 | Status: SHIPPED | OUTPATIENT
Start: 2019-08-02

## 2019-09-06 ENCOUNTER — OFFICE VISIT (OUTPATIENT)
Dept: INTERNAL MEDICINE | Age: 44
End: 2019-09-06

## 2019-09-06 VITALS
WEIGHT: 132.8 LBS | HEART RATE: 64 BPM | RESPIRATION RATE: 13 BRPM | TEMPERATURE: 98.3 F | BODY MASS INDEX: 25.07 KG/M2 | OXYGEN SATURATION: 98 % | HEIGHT: 61 IN | DIASTOLIC BLOOD PRESSURE: 70 MMHG | SYSTOLIC BLOOD PRESSURE: 110 MMHG

## 2019-09-06 DIAGNOSIS — Z76.89 ESTABLISHING CARE WITH NEW DOCTOR, ENCOUNTER FOR: ICD-10-CM

## 2019-09-06 DIAGNOSIS — F41.8 DEPRESSION WITH ANXIETY: Primary | ICD-10-CM

## 2019-09-06 PROBLEM — E55.9 VITAMIN D DEFICIENCY: Status: ACTIVE | Noted: 2019-09-06

## 2019-09-06 PROBLEM — N32.81 OAB (OVERACTIVE BLADDER): Status: ACTIVE | Noted: 2019-09-06

## 2019-09-06 PROCEDURE — 99204 OFFICE O/P NEW MOD 45 MIN: CPT | Performed by: INTERNAL MEDICINE

## 2019-09-06 NOTE — PROGRESS NOTES
"  Ellie Muro is a 43 y.o. female who presents with   Chief Complaint   Patient presents with   • Depression with anxiety     Using Wellbutrin 150 mg daily with as needed lorazepam 0.5 mg   • Establishing care with new physician     Former patient of Dr. Vaughn   .    43-year-old female presents to get established for future medical care since her previous physician (Dr. Vaughn )has apparently left practice. The patient only saw her \"a couple of times\" she says and is currently under treatment for depression with occasional anxiety.  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  She has a business on the side that additionally is a stressful situation.  She says the Wellbutrin 150 mg that she takes daily keeps her under control and she only uses Lorazepam very sparingly for any stress or anxiety that needs more immediate treatment.  She also has a history of overactive bladder and has seen nurse practitioner Basilia Franco who prescribed Myrbetriq for her and that works exceedingly well also she says.         The following portions of the patient's history were reviewed and updated as appropriate: allergies, current medications, past medical history and problem list.    Review of Systems   Constitutional: Negative.    HENT: Negative.    Eyes: Negative.    Respiratory: Negative.    Cardiovascular: Negative.    Genitourinary: Negative.    Musculoskeletal: Negative.    Skin: Negative.    Neurological: Negative.    Psychiatric/Behavioral: Negative.        Objective   Physical Exam   Constitutional: She is oriented to person, place, and time. She appears well-developed and well-nourished. No distress.   HENT:   Head: Normocephalic and atraumatic.   Eyes: Conjunctivae and EOM are normal. Pupils are equal, round, and reactive to light.   Neck: Normal range of motion. Neck supple. No thyromegaly present. "   Neck exam negative.  Carotid auscultation normal-no bruits heard.   Cardiovascular: Normal rate, regular rhythm, normal heart sounds and intact distal pulses. Exam reveals no gallop and no friction rub.   No murmur heard.  Pulmonary/Chest: Effort normal and breath sounds normal. No respiratory distress. She has no wheezes. She has no rales. She exhibits no tenderness.   Neurological: She is alert and oriented to person, place, and time.   Psychiatric: She has a normal mood and affect. Her behavior is normal. Judgment and thought content normal.   Nursing note and vitals reviewed.      Assessment/Plan   Ellie was seen today for depression with anxiety and establishing care with new physician.    Diagnoses and all orders for this visit:    Depression with anxiety    Establishing care with new doctor, encounter for      Plan: At this point I would suggest that the patient simply continue all treatment as prescribed since it appears to be keeping her under control.  She said her previous physician did recommend a psychiatry referral but at this point I see no necessity of that.    I have suggested that we see the patient in 6 months for a physical/lab update visit and from that point on likely we will be seeing her on 6-month intervals.    She was advised of the controlled substance nature of lorazepam and the need to  written prescriptions whenever prescriptions are needed.

## 2020-01-03 DIAGNOSIS — F41.9 ANXIETY AND DEPRESSION: ICD-10-CM

## 2020-01-03 DIAGNOSIS — F32.A ANXIETY AND DEPRESSION: ICD-10-CM

## 2020-01-03 RX ORDER — BUPROPION HYDROCHLORIDE 150 MG/1
150 TABLET ORAL DAILY
Qty: 90 TABLET | Refills: 0 | Status: SHIPPED | OUTPATIENT
Start: 2020-01-03 | End: 2020-04-06

## 2020-04-06 DIAGNOSIS — F41.9 ANXIETY AND DEPRESSION: ICD-10-CM

## 2020-04-06 DIAGNOSIS — F32.A ANXIETY AND DEPRESSION: ICD-10-CM

## 2020-04-06 RX ORDER — BUPROPION HYDROCHLORIDE 150 MG/1
150 TABLET ORAL DAILY
Qty: 90 TABLET | Refills: 1 | Status: SHIPPED | OUTPATIENT
Start: 2020-04-06 | End: 2020-09-08

## 2020-08-10 ENCOUNTER — OFFICE VISIT (OUTPATIENT)
Dept: INTERNAL MEDICINE | Age: 45
End: 2020-08-10

## 2020-08-10 DIAGNOSIS — I82.4Y9 ACUTE DEEP VEIN THROMBOSIS (DVT) OF PROXIMAL VEIN OF LOWER EXTREMITY, UNSPECIFIED LATERALITY (HCC): Primary | ICD-10-CM

## 2020-08-10 PROCEDURE — 99442 PR PHYS/QHP TELEPHONE EVALUATION 11-20 MIN: CPT | Performed by: INTERNAL MEDICINE

## 2020-08-10 NOTE — PROGRESS NOTES
Ellie Muro is a 44 y.o. female who presents with   Chief Complaint   Patient presents with   • Calf pain/tenderness     2 days; feels like a bruise; takes birth control pills   .    44-year-old female with calf tenderness and pain for 2 days with a history of intake of birth control pills as noted above.  No shortness of breath or cough.       There were no vitals taken for this visit. Due to the nature of today's telephone visit, vital signs could not be performed.      The following portions of the patient's history were reviewed and updated as appropriate: allergies, current medications, past medical history and problem list.    Review of Systems   Constitutional: Negative.    Respiratory: Negative.  Negative for shortness of breath.    Cardiovascular: Negative.    Neurological: Negative.    Psychiatric/Behavioral: Negative.        Objective   Physical Exam Due to the nature of today's telephone visit a physical exam could not be performed.  Physically he offered no complaints or problems on today's discussion however.      Assessment/Plan   Ellie was seen today for calf pain/tenderness.    Diagnoses and all orders for this visit:    Acute deep vein thrombosis (DVT) of proximal vein of lower extremity, unspecified laterality (CMS/HCC)      Plan: Patient advised to be seen at ER for further evaluation to rule out DVT    Verbal consent obtained for this telephone visit.    Total amount of time spent with this patient on this telephone visit-11 minutes

## 2020-09-07 DIAGNOSIS — F32.A ANXIETY AND DEPRESSION: ICD-10-CM

## 2020-09-07 DIAGNOSIS — F41.9 ANXIETY AND DEPRESSION: ICD-10-CM

## 2020-09-08 RX ORDER — BUPROPION HYDROCHLORIDE 150 MG/1
150 TABLET ORAL DAILY
Qty: 90 TABLET | Refills: 1 | Status: SHIPPED | OUTPATIENT
Start: 2020-09-08

## 2021-03-08 DIAGNOSIS — F32.A ANXIETY AND DEPRESSION: ICD-10-CM

## 2021-03-08 DIAGNOSIS — F41.9 ANXIETY AND DEPRESSION: ICD-10-CM

## 2021-03-08 RX ORDER — BUPROPION HYDROCHLORIDE 150 MG/1
150 TABLET ORAL DAILY
Qty: 90 TABLET | Refills: 0 | OUTPATIENT
Start: 2021-03-08

## 2021-04-02 ENCOUNTER — BULK ORDERING (OUTPATIENT)
Dept: CASE MANAGEMENT | Facility: OTHER | Age: 46
End: 2021-04-02

## 2021-04-02 DIAGNOSIS — Z23 IMMUNIZATION DUE: ICD-10-CM
